# Patient Record
Sex: FEMALE | URBAN - METROPOLITAN AREA
[De-identification: names, ages, dates, MRNs, and addresses within clinical notes are randomized per-mention and may not be internally consistent; named-entity substitution may affect disease eponyms.]

---

## 2021-01-14 ENCOUNTER — OUTPATIENT (OUTPATIENT)
Dept: OUTPATIENT SERVICES | Facility: HOSPITAL | Age: 66
LOS: 1 days | End: 2021-01-14

## 2021-07-23 ENCOUNTER — OUTPATIENT (OUTPATIENT)
Dept: OUTPATIENT SERVICES | Facility: HOSPITAL | Age: 66
LOS: 1 days | End: 2021-07-23

## 2022-12-13 PROBLEM — Z00.00 ENCOUNTER FOR PREVENTIVE HEALTH EXAMINATION: Status: ACTIVE | Noted: 2022-12-13

## 2022-12-28 ENCOUNTER — NON-APPOINTMENT (OUTPATIENT)
Age: 67
End: 2022-12-28

## 2022-12-28 VITALS — BODY MASS INDEX: 22.45 KG/M2 | HEIGHT: 62 IN | WEIGHT: 122 LBS

## 2022-12-28 NOTE — HISTORY OF PRESENT ILLNESS
[Former] : Former [TextBox_13] : Referred by Dr. Luciana Davis\par \par Ms. BAKER is a 67 year old female with a history of high cholesterol.\par \par She was called to review eligibility for Low-Dose CT lung cancer screening.  Reviewed and confirmed that the patient meets screening eligibility criteria:\par \par 67 years old \par \par Smoking Status: Former smoker \par \par Number of pack(s) per day: 1/2\par Number of years smoked: 40\par Number of pack years smokin\par \par Number of years since quitting smokin\par Quit year: \par \par No symptoms of lung cancer, including new cough, change in cough, hemoptysis, and unintentional weight loss.\par \par No personal history of lung cancer.  No lung cancer in a first degree relative.  No history of lung disease or occupational exposures. [YearQuit] : 2019 [PacksperDay] : 0.5 [N_Years] : 40 [PacksperYear] : 20

## 2023-01-03 ENCOUNTER — APPOINTMENT (OUTPATIENT)
Dept: CT IMAGING | Facility: CLINIC | Age: 68
End: 2023-01-03
Payer: COMMERCIAL

## 2023-01-03 ENCOUNTER — APPOINTMENT (OUTPATIENT)
Dept: RADIOLOGY | Facility: CLINIC | Age: 68
End: 2023-01-03
Payer: COMMERCIAL

## 2023-01-03 PROCEDURE — 71271 CT THORAX LUNG CANCER SCR C-: CPT

## 2023-01-03 PROCEDURE — 77080 DXA BONE DENSITY AXIAL: CPT

## 2023-01-10 ENCOUNTER — APPOINTMENT (OUTPATIENT)
Dept: CARDIOLOGY | Facility: CLINIC | Age: 68
End: 2023-01-10
Payer: COMMERCIAL

## 2023-01-10 ENCOUNTER — NON-APPOINTMENT (OUTPATIENT)
Age: 68
End: 2023-01-10

## 2023-01-10 VITALS — SYSTOLIC BLOOD PRESSURE: 124 MMHG | DIASTOLIC BLOOD PRESSURE: 84 MMHG

## 2023-01-10 VITALS
HEIGHT: 63 IN | DIASTOLIC BLOOD PRESSURE: 84 MMHG | HEART RATE: 78 BPM | OXYGEN SATURATION: 97 % | BODY MASS INDEX: 21.97 KG/M2 | SYSTOLIC BLOOD PRESSURE: 122 MMHG | WEIGHT: 124 LBS

## 2023-01-10 DIAGNOSIS — Z82.49 FAMILY HISTORY OF ISCHEMIC HEART DISEASE AND OTHER DISEASES OF THE CIRCULATORY SYSTEM: ICD-10-CM

## 2023-01-10 DIAGNOSIS — U07.1 COVID-19: ICD-10-CM

## 2023-01-10 DIAGNOSIS — Z78.9 OTHER SPECIFIED HEALTH STATUS: ICD-10-CM

## 2023-01-10 DIAGNOSIS — Z83.438 FAMILY HISTORY OF OTHER DISORDER OF LIPOPROTEIN METABOLISM AND OTHER LIPIDEMIA: ICD-10-CM

## 2023-01-10 DIAGNOSIS — Z87.891 PERSONAL HISTORY OF NICOTINE DEPENDENCE: ICD-10-CM

## 2023-01-10 PROCEDURE — 93000 ELECTROCARDIOGRAM COMPLETE: CPT

## 2023-01-10 PROCEDURE — 99204 OFFICE O/P NEW MOD 45 MIN: CPT | Mod: 25

## 2023-01-10 RX ORDER — ESCITALOPRAM OXALATE 20 MG/1
20 TABLET, FILM COATED ORAL
Refills: 0 | Status: ACTIVE | COMMUNITY

## 2023-01-10 NOTE — REASON FOR VISIT
[FreeTextEntry1] : Beverly is a pleasant 67-year-old female with history of CAD: Detected by imaging study and was told that she has moderate atherosclerosis (patient is not sure regarding which test), hypercholesteremia, and intolerance to statins\par \par She is fairly active and does not have exertional chest pain or shortness of breath.  Denies any palpitations, dizziness, pedal edema, PND.\par \par The patient has body aches with statins.  On low-dose simvastatin (10 mg daily) her LDL has decreased to 64 however she is complaining of mild body ache and wants to reduce it to 5 mg daily.\par \par

## 2023-01-10 NOTE — ASSESSMENT
[FreeTextEntry1] : Reviewed today:\par -EKG 1/10/2023: Sinus rhythm, unremarkable\par -LDL 64.  Normal creatinine.  Normal LFT.  Normal TSH.  A1c 5.5.

## 2023-02-03 ENCOUNTER — APPOINTMENT (OUTPATIENT)
Dept: CARDIOLOGY | Facility: CLINIC | Age: 68
End: 2023-02-03
Payer: COMMERCIAL

## 2023-02-03 PROCEDURE — 93306 TTE W/DOPPLER COMPLETE: CPT

## 2023-02-03 PROCEDURE — 93015 CV STRESS TEST SUPVJ I&R: CPT

## 2023-02-03 PROCEDURE — 76376 3D RENDER W/INTRP POSTPROCES: CPT

## 2023-02-13 ENCOUNTER — APPOINTMENT (OUTPATIENT)
Dept: CARDIOLOGY | Facility: CLINIC | Age: 68
End: 2023-02-13
Payer: COMMERCIAL

## 2023-02-13 DIAGNOSIS — I25.10 ATHEROSCLEROTIC HEART DISEASE OF NATIVE CORONARY ARTERY W/OUT ANGINA PECTORIS: ICD-10-CM

## 2023-02-13 PROCEDURE — 99213 OFFICE O/P EST LOW 20 MIN: CPT | Mod: 95

## 2023-02-13 RX ORDER — ROSUVASTATIN CALCIUM 10 MG/1
10 TABLET, FILM COATED ORAL DAILY
Refills: 0 | Status: ACTIVE | COMMUNITY
Start: 2022-08-09

## 2023-02-13 RX ORDER — SIMVASTATIN 10 MG/1
10 TABLET, FILM COATED ORAL
Refills: 0 | Status: DISCONTINUED | COMMUNITY
End: 2023-02-13

## 2023-02-13 NOTE — ASSESSMENT
[FreeTextEntry1] : Reviewed today:\par -EKG 1/10/2023: Sinus rhythm, unremarkable\par -LDL 64.  Normal creatinine.  Normal LFT.  Normal TSH.  A1c 5.5.\par -ETT February 2023: Exercised 12: 22 minutes of Giancarlo protocol.  No ischemic EKG changes and no angina.\par -Echo February 2023: Normal LV function and wall motion.  Normal diastolic function.

## 2023-02-13 NOTE — DISCUSSION/SUMMARY
[FreeTextEntry1] : The patient has reduced her statins to 5 mg rosuvastatin daily.  LDL and other labs will be done on 2/21/2023.\par \par It would be important to obtain records of the previous cardiac work-up.  If it shows moderate coronary atherosclerosis, the patient would be a candidate for PCSK9 inhibitor treatment in case she cannot tolerate statins\par \par She should have carotid ultrasound in future.  Follow-up after.\par \par Thank you for this referral and allowing me to participate in the care of this patient.  If I can be of any further help or  if you have any questions, please do not hesitate to contact me\par \par \par Sincerely,\par \par Meño Dumont MD, FACC, DANA\par \par

## 2023-02-13 NOTE — REASON FOR VISIT
[FreeTextEntry1] : This was a video conference call.  \par Patient was present at her home residence \par I was in the [facility]\par Informed consent was obtained\par The preparation, video conferencing, face-to-face discussion, review of data and planning took 20 minutes\par \par \par Beverly is a pleasant 67-year-old female with history of CAD: Detected by imaging study and was told that she has moderate atherosclerosis (patient is not sure regarding which test), hypercholesteremia, and intolerance to statins\par \par She is fairly active and does not have exertional chest pain or shortness of breath.  Denies any palpitations, dizziness, pedal edema, PND.\par \par The patient has body aches with statins.  On low-dose simvastatin (10 mg daily) her LDL has decreased to 64 however she is complaining of mild body ache and has reduced it to 5 mg daily.\par \par She underwent echo and stress testing which were unremarkable reported below.

## 2023-02-27 ENCOUNTER — NON-APPOINTMENT (OUTPATIENT)
Age: 68
End: 2023-02-27

## 2023-03-03 ENCOUNTER — APPOINTMENT (OUTPATIENT)
Dept: MAMMOGRAPHY | Facility: CLINIC | Age: 68
End: 2023-03-03
Payer: COMMERCIAL

## 2023-03-03 PROCEDURE — 77067 SCR MAMMO BI INCL CAD: CPT

## 2023-03-03 PROCEDURE — 77063 BREAST TOMOSYNTHESIS BI: CPT

## 2023-03-09 ENCOUNTER — NON-APPOINTMENT (OUTPATIENT)
Age: 68
End: 2023-03-09

## 2023-06-01 ENCOUNTER — RESULT REVIEW (OUTPATIENT)
Age: 68
End: 2023-06-01

## 2023-08-07 ENCOUNTER — APPOINTMENT (OUTPATIENT)
Dept: CARDIOLOGY | Facility: CLINIC | Age: 68
End: 2023-08-07
Payer: COMMERCIAL

## 2023-08-07 VITALS
DIASTOLIC BLOOD PRESSURE: 74 MMHG | HEIGHT: 63 IN | RESPIRATION RATE: 14 BRPM | SYSTOLIC BLOOD PRESSURE: 118 MMHG | BODY MASS INDEX: 22.15 KG/M2 | HEART RATE: 76 BPM | WEIGHT: 125 LBS | OXYGEN SATURATION: 98 %

## 2023-08-07 DIAGNOSIS — Z86.19 PERSONAL HISTORY OF OTHER INFECTIOUS AND PARASITIC DISEASES: ICD-10-CM

## 2023-08-07 DIAGNOSIS — E78.2 MIXED HYPERLIPIDEMIA: ICD-10-CM

## 2023-08-07 DIAGNOSIS — M79.10 MYALGIA, UNSPECIFIED SITE: ICD-10-CM

## 2023-08-07 DIAGNOSIS — Z86.69 PERSONAL HISTORY OF OTHER DISEASES OF THE NERVOUS SYSTEM AND SENSE ORGANS: ICD-10-CM

## 2023-08-07 DIAGNOSIS — F41.9 ANXIETY DISORDER, UNSPECIFIED: ICD-10-CM

## 2023-08-07 DIAGNOSIS — F32.A ANXIETY DISORDER, UNSPECIFIED: ICD-10-CM

## 2023-08-07 DIAGNOSIS — Z87.891 PERSONAL HISTORY OF NICOTINE DEPENDENCE: ICD-10-CM

## 2023-08-07 DIAGNOSIS — E78.00 PURE HYPERCHOLESTEROLEMIA, UNSPECIFIED: ICD-10-CM

## 2023-08-07 DIAGNOSIS — Z78.9 OTHER SPECIFIED HEALTH STATUS: ICD-10-CM

## 2023-08-07 PROCEDURE — 99214 OFFICE O/P EST MOD 30 MIN: CPT

## 2023-08-07 PROCEDURE — 93880 EXTRACRANIAL BILAT STUDY: CPT

## 2023-08-07 RX ORDER — EZETIMIBE 10 MG/1
10 TABLET ORAL
Qty: 45 | Refills: 1 | Status: ACTIVE | COMMUNITY
Start: 2023-08-07 | End: 1900-01-01

## 2023-08-07 RX ORDER — EZETIMIBE 10 MG/1
10 TABLET ORAL
Qty: 90 | Refills: 0 | Status: DISCONTINUED | COMMUNITY
Start: 2023-03-09 | End: 2023-08-07

## 2023-08-07 NOTE — REASON FOR VISIT
[FreeTextEntry1] : Beverly is a pleasant 67-year-old female with history of CAD: Detected by imaging study and was told that she has moderate atherosclerosis (patient is not sure regarding which test), hypercholesteremia, and intolerance to statins  She is fairly active and does not have exertional chest pain or shortness of breath.  Denies any palpitations, dizziness, pedal edema, PND.  The patient has body aches with statins.  On low-dose simvastatin (10 mg daily) her LDL has decreased to 64 however she is complaining of mild body ache and has reduced it to 5 mg daily.  She underwent echo and stress testing in February 2023 which were unremarkable reported below.

## 2023-08-07 NOTE — DISCUSSION/SUMMARY
[FreeTextEntry1] : The patient has reduced her statins to 5 mg rosuvastatin daily.   LDL was 79 in February 2023.  She is not taking Zetia but would like to start.  It would be important to obtain records of the previous cardiac work-up.  If it shows moderate coronary atherosclerosis, the patient would be a candidate for PCSK9 inhibitor treatment in case she cannot tolerate statins  Carotid ultrasound was performed today.  Minimal atherosclerosis bilaterally was noted.  Thank you for this referral and allowing me to participate in the care of this patient.  If I can be of any further help or  if you have any questions, please do not hesitate to contact me  We will follow-up in 6 months  Sincerely,  Meño Dumont MD, FACC, DANA

## 2024-02-01 ENCOUNTER — APPOINTMENT (OUTPATIENT)
Dept: CARDIOLOGY | Facility: CLINIC | Age: 69
End: 2024-02-01

## 2025-07-18 ENCOUNTER — APPOINTMENT (OUTPATIENT)
Dept: GYNECOLOGIC ONCOLOGY | Facility: CLINIC | Age: 70
End: 2025-07-18